# Patient Record
Sex: FEMALE | Race: WHITE
[De-identification: names, ages, dates, MRNs, and addresses within clinical notes are randomized per-mention and may not be internally consistent; named-entity substitution may affect disease eponyms.]

---

## 2021-11-27 ENCOUNTER — HOSPITAL ENCOUNTER (EMERGENCY)
Dept: HOSPITAL 41 - JD.ED | Age: 26
Discharge: HOME | End: 2021-11-27
Payer: COMMERCIAL
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
## 2021-11-27 DIAGNOSIS — S92.325A: Primary | ICD-10-CM
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
## 2021-11-27 DIAGNOSIS — S92.335A: ICD-10-CM

## 2021-11-27 NOTE — CR
Left foot: 4 views of the left foot were obtained.

 

Comparison: No prior foot exam is available.

 

Nondisplaced fractures are seen within the mid one-third diaphysis of 

the second and third metatarsals.  Joint spaces are maintained.  No 

additional fracture, dislocation or other bony abnormality is seen.  

Soft tissue swelling is noted.

 

Impression:

1.  Nondisplaced fractures within the shafts of the second and third 

metatarsals.

2.  Adjacent soft tissue swelling.

 

Diagnostic code #3

## 2021-11-27 NOTE — EDM.PDOC
ED HPI GENERAL MEDICAL PROBLEM





- General


Chief Complaint: Lower Extremity Injury/Pain


Stated Complaint: L FOOT INJURY


Time Seen by Provider: 11/27/21 11:20


Source of Information: Reports: Patient


History Limitations: Reports: No Limitations





- History of Present Illness


INITIAL COMMENTS - FREE TEXT/NARRATIVE: 


26-year-old female presents the emergency department with complaints of a left 

foot injury.  Patient states she was at the gym and dropped a 45 pound weight on

her left foot.





  ** Left Foot


Pain Score (Numeric/FACES): 3





- Related Data


                                    Allergies











Allergy/AdvReac Type Severity Reaction Status Date / Time


 


No Known Allergies Allergy   Verified 11/27/21 11:11











Home Meds: 


                                    Home Meds





Sertraline [Zoloft] 50 mg PO DAILY 11/27/21 [History]











Past Medical History


Psychiatric History: Reports: Depression





Social & Family History





- Tobacco Use


Tobacco Use Status *Q: Never Tobacco User





- Alcohol Use


Days Per Week of Alcohol Use: 1


Number of Drinks Per Day: 1


Total Drinks Per Week: 1





- Recreational Drug Use


Recreational Drug Use: No





Review of Systems





- Review of Systems


Review Of Systems: Comprehensive ROS is negative, except as noted in HPI.





ED EXAM, GENERAL





- Physical Exam


Exam: See Below


Exam Limited By: No Limitations


General Appearance: Alert, WD/WN, No Apparent Distress


Ears: Normal External Exam, Hearing Grossly Normal


Nose: Normal Inspection


Throat/Mouth: Normal Inspection, Normal Lips, Normal Voice, No Airway Compromise


Head: Atraumatic, Normocephalic


Neck: Normal Inspection, Supple


Respiratory/Chest: No Respiratory Distress, No Accessory Muscle Use


Cardiovascular: Normal Peripheral Pulses, Regular Rate, Rhythm


Peripheral Pulses: 2+: Dorsalis Pedis (L), Dorsalis Pedis (R)


GI/Abdominal: No Distention


 (Female) Exam: Deferred


Rectal (Female) Exam: Deferred


Back Exam: Normal Inspection


Extremities: Normal Range of Motion, Normal Capillary Refill.  No: Normal 

Inspection (Swelling noted to dorsal aspect of left foot over the metatarsal 

area), Non-Tender (Tenderness noted to left foot), No Pedal Edema (Swelling 

noted over the dorsal aspect of left foot.)


Neurological: Alert, Oriented, Normal Cognition


Psychiatric: Normal Affect, Normal Mood


Skin Exam: Warm, Dry, Intact, Normal Color, No Rash


Lymphatic: No Adenopathy





Course





- Vital Signs


Text/Narrative:: 





Exam reveals tenderness to dorsal aspect of left foot over the metatarsal area. 

 CMS is intact.  Pedal pulses intact and patient is able to wiggle her toes.  

Will obtain x-rays of the left foot.


Last Recorded V/S: 


                                Last Vital Signs











Temp  98.1 F   11/27/21 11:07


 


Pulse  68   11/27/21 11:00


 


Resp  16   11/27/21 11:00


 


BP  129/83   11/27/21 11:00


 


Pulse Ox  100   11/27/21 11:00














- Orders/Labs/Meds


Orders: 


                               Active Orders 24 hr











 Category Date Time Status


 


 Foot Comp Min 3V Lt [CR] Stat Exams  11/27/21 11:09 Taken


 


 DME for Discharge [COMM] Stat Oth  11/27/21 11:27 Ordered














- Radiology Interpretation


Free Text/Narrative:: 





X-ray of left foot was reviewed and there is a fracture noted to second and 

third metatarsal.  Patient will be placed in a walking boot and will be 

nonweightbearing to prevent further injury.  She will need to follow-up with Dr. Ragsdale.





Departure





- Departure


Time of Disposition: 11:45


Disposition: Home, Self-Care 01


Condition: Good


Clinical Impression: 


Fracture of metatarsal bone of left foot


Qualifiers:


 Encounter type: initial encounter Metatarsal bone: unspecified metatarsal 

Fracture type: closed Fracture alignment: nondisplaced Qualified Code(s): 

S92.302A - Fracture of unspecified metatarsal bone(s), left foot, initial 

encounter for closed fracture








- Discharge Information


Instructions:  Crutch Use, Adult, Easy-to-Read, Metatarsal Fracture


Referrals: 


Aria Kan MD [Primary Care Provider] - 


Forms:  ED Department Discharge


Additional Instructions: 


You were seen in the emergency department today for an injury to your left foot.

 X-rays were completed and you do have 2 for open bones noted in your foot.  Is 

on the second third metatarsal.  You are placed in a walking boot and given 

crutches.  You cannot bear any weight despite wearing the boot.  You will need 

to follow-up with Dr. Ragsdale, orthopedic surgeon at [facility].  Call first thing Monday to schedule an appointment.  Phone number is

[phone].  Next couple of days you will have increasing discomfort.  

Recommend alternating Tylenol 650 mg with ibuprofen 600 mg every 4 hours for the

next 48 hours and then as needed after that.  Rest, ice 30 minutes at a time 

every 3 hours while awake, and elevate the foot as much as possible.





Sepsis Event Note (ED)





- Focused Exam


Vital Signs: 


                                   Vital Signs











  Temp Pulse Resp BP Pulse Ox


 


 11/27/21 11:07  98.1 F    


 


 11/27/21 11:00  97.2 F  68  16  129/83  100














- My Orders


Last 24 Hours: 


My Active Orders





11/27/21 11:09


Foot Comp Min 3V Lt [CR] Stat 





11/27/21 11:27


DME for Discharge [COMM] Stat 














- Assessment/Plan


Last 24 Hours: 


My Active Orders





11/27/21 11:09


Foot Comp Min 3V Lt [CR] Stat 





11/27/21 11:27


DME for Discharge [COMM] Stat